# Patient Record
Sex: MALE
[De-identification: names, ages, dates, MRNs, and addresses within clinical notes are randomized per-mention and may not be internally consistent; named-entity substitution may affect disease eponyms.]

---

## 2017-04-14 ENCOUNTER — TRANSCRIPTION ENCOUNTER (OUTPATIENT)
Age: 42
End: 2017-04-14

## 2017-12-16 ENCOUNTER — TRANSCRIPTION ENCOUNTER (OUTPATIENT)
Age: 42
End: 2017-12-16

## 2020-11-24 ENCOUNTER — APPOINTMENT (OUTPATIENT)
Dept: OTOLARYNGOLOGY | Facility: CLINIC | Age: 45
End: 2020-11-24
Payer: COMMERCIAL

## 2020-11-24 VITALS — TEMPERATURE: 97 F | BODY MASS INDEX: 25.18 KG/M2 | WEIGHT: 170 LBS | HEIGHT: 69 IN

## 2020-11-24 DIAGNOSIS — J31.0 CHRONIC RHINITIS: ICD-10-CM

## 2020-11-24 DIAGNOSIS — R04.0 EPISTAXIS: ICD-10-CM

## 2020-11-24 PROBLEM — Z00.00 ENCOUNTER FOR PREVENTIVE HEALTH EXAMINATION: Status: ACTIVE | Noted: 2020-11-24

## 2020-11-24 PROCEDURE — 31231 NASAL ENDOSCOPY DX: CPT

## 2020-11-24 PROCEDURE — 99203 OFFICE O/P NEW LOW 30 MIN: CPT | Mod: 25

## 2020-11-24 NOTE — HISTORY OF PRESENT ILLNESS
[de-identified] : Pt seen in consultation for Dr Garcia.  He reports 10 episodes of epistaxis over the weekend from the right side of his nose. Please were both anterior and posterior. He has had some associated headaches. He had nosebleeds when he was younger, but not  in many years. No history of hypertension, anticoagulation, recent upper respiratory tract infections but he does have severe allergies which have been exacerbated by a move to [place], and is on FLonase. He does use a humidifier but has not been using ointment

## 2020-11-24 NOTE — ASSESSMENT
[FreeTextEntry1] : Arborizing vessels and right little's area.  I offered him prophylactic cauterization versus conservative management with saline spray, ointment, and humidifier. He may wish to trial off the Flonase. He decided to discern and will Followup if worse, consider cautery at that point

## 2023-11-22 ENCOUNTER — NON-APPOINTMENT (OUTPATIENT)
Age: 48
End: 2023-11-22

## 2023-12-20 ENCOUNTER — NON-APPOINTMENT (OUTPATIENT)
Age: 48
End: 2023-12-20

## 2024-04-12 ENCOUNTER — NON-APPOINTMENT (OUTPATIENT)
Age: 49
End: 2024-04-12

## 2025-01-22 ENCOUNTER — NON-APPOINTMENT (OUTPATIENT)
Age: 50
End: 2025-01-22